# Patient Record
Sex: FEMALE | Race: WHITE
[De-identification: names, ages, dates, MRNs, and addresses within clinical notes are randomized per-mention and may not be internally consistent; named-entity substitution may affect disease eponyms.]

---

## 2021-06-17 ENCOUNTER — HOSPITAL ENCOUNTER (OUTPATIENT)
Dept: HOSPITAL 84 - D.OPS | Age: 51
Discharge: HOME | End: 2021-06-17
Attending: PODIATRIST
Payer: MEDICARE

## 2021-06-17 VITALS — DIASTOLIC BLOOD PRESSURE: 84 MMHG | SYSTOLIC BLOOD PRESSURE: 123 MMHG

## 2021-06-17 VITALS — HEIGHT: 64 IN | BODY MASS INDEX: 26.46 KG/M2 | WEIGHT: 155 LBS | BODY MASS INDEX: 26.46 KG/M2

## 2021-06-17 DIAGNOSIS — M20.11: Primary | ICD-10-CM

## 2021-06-17 DIAGNOSIS — B35.1: ICD-10-CM

## 2021-06-17 LAB
ANION GAP SERPL CALC-SCNC: 13.2 MMOL/L (ref 8–16)
BASOPHILS NFR BLD AUTO: 1 % (ref 0–2)
BUN SERPL-MCNC: 5 MG/DL (ref 7–18)
CALCIUM SERPL-MCNC: 9.7 MG/DL (ref 8.5–10.1)
CHLORIDE SERPL-SCNC: 94 MMOL/L (ref 98–107)
CO2 SERPL-SCNC: 32.2 MMOL/L (ref 21–32)
CREAT SERPL-MCNC: 0.7 MG/DL (ref 0.6–1.3)
EOSINOPHIL NFR BLD: 1.4 % (ref 0–7)
ERYTHROCYTE [DISTWIDTH] IN BLOOD BY AUTOMATED COUNT: 20.1 % (ref 11.5–14.5)
GLUCOSE SERPL-MCNC: 94 MG/DL (ref 74–106)
HCT VFR BLD CALC: 38.1 % (ref 36–48)
HGB BLD-MCNC: 12.2 G/DL (ref 12–16)
LYMPHOCYTES # BLD: 1.9 10X3/UL (ref 1.18–3.74)
LYMPHOCYTES NFR BLD AUTO: 21.3 % (ref 15–50)
MCH RBC QN AUTO: 22.6 PG (ref 26–34)
MCHC RBC AUTO-ENTMCNC: 32.1 G/DL (ref 31–37)
MCV RBC: 70.4 FL (ref 80–100)
MONOCYTES NFR BLD: 7 % (ref 2–11)
NEUTROPHILS # BLD: 6.1 10X3/UL (ref 1.56–6.13)
NEUTROPHILS NFR BLD AUTO: 69.3 % (ref 40–80)
OSMOLALITY SERPL CALC.SUM OF ELEC: 268 MOSM/KG (ref 275–300)
PLATELET # BLD: 285 10X3/UL (ref 130–400)
PMV BLD AUTO: 7 FL (ref 7.4–10.4)
POTASSIUM SERPL-SCNC: 3.4 MMOL/L (ref 3.5–5.1)
RBC # BLD AUTO: 5.41 10X6/UL (ref 4–5.4)
SODIUM SERPL-SCNC: 136 MMOL/L (ref 136–145)
WBC # BLD AUTO: 8.8 10X3/UL (ref 4.8–10.8)

## 2021-06-17 NOTE — OP
PATIENT NAME:  ABIMAEL KEARNS                            MEDICAL RECORD: [id number]
:70                                             LOCATION:D.OPS          
                                                         ADMISSION DATE:        
SURGEON:  KRISTIAN WOODRUFF             
 
 
DATE OF OPERATION:  2021
 
SURGEON:  Kristian Woodruff DPM
 
PREOPERATIVE DIAGNOSIS:  Hallux abductovalgus, right foot.
 
POSTOPERATIVE DIAGNOSIS:  Hallux abductovalgus, right foot.
 
PROCEDURE:  Bakari bunionectomy, right foot.
 
ANESTHESIA:  Local with monitored anesthesia care.
 
HEMOSTASIS:  Pneumatic ankle tourniquet inflated to 250 mmHg.
 
ESTIMATED BLOOD LOSS:  Minimal.
 
MATERIALS:  A 3-0 Vicryl, 5-0 Vicryl, 4-0 nylon, one 2.5 x 18 mm headless
[employer] screw ([employer]).
 
INJECTABLES:  20 cc of 0.25% Marcaine plain preoperatively.
 
The patient has a longstanding history of pain associated with a bunion
deformity on the right foot.  She has tried wider shoes to no avail.  She is
here today for surgical correction of this chronically painful deformity.  We
again reviewed the risks and benefits of the procedure.  Complications were
discussed.  All questions were answered.  She was appropriately consented for
the above-mentioned procedure.
 
DESCRIPTION OF PROCEDURE:  The patient was brought into the operating room and
placed on the operating table in a supine position.  A timeout was called with
Dr. Woodruff, who identified the patient, the surgical site, and the surgery to
be performed.  Once appropriate anesthesia was obtained, the foot was prepped
and draped in the usual aseptic manner.
 
Attention was directed to the dorsal aspect of the first metatarsophalangeal
joint where a 6 cm linear incision was made just over the first
metatarsophalangeal joint.  This incision was carried deep through soft tissue
with care being taken to retract all vital neurovascular structures.  All
bleeders were cauterized along the way.  The periosteum was then reflected from
the head of the first metatarsal and the base of the proximal phalanx, thus
exposing the hypertrophied medial eminence of the first metatarsal head. 
Utilizing a sagittal saw, the hypertrophied medial eminence was resected. 
Attention was then directed to the first intermetatarsal space where utilizing
blunt and sharp dissection, the fibular sesamoidal ligament was identified and
sharply transected.  Attention was then directed to the base of the proximal
phalanx where the adductor hallucis tendon was identified and sharply
transected.  Attention was then redirected to the medial aspect of the first
metatarsal head where a V-shaped osteotomy was created in the head of the bone. 
This was a through and through osteotomy with the apex oriented distally.  The
capital fragment was then translocated laterally and impacted upon the first
metatarsal shaft.  Next, utilizing 's recommended technique one 2.5
mm screw was placed across the osteotomy.  All remaining overhanging bone was
 
 
 
OPERATIVE REPORT                               [id number]    URMILAABIMAEL GAMBLE          
 
 
removed with a bone saw.  The surgical site was then irrigated with copious
amounts of normal sterile saline via bulb syringe.  The periosteum was then
reapproximated and coapted using 3-0 Vicryl.  Subcutaneous was then
reapproximated and coapted using 5-0 Vicryl.  The skin was then reapproximated
and coapted utilizing 4-0 nylon.  A dressing consisting of Adaptic, Xeroform, 4
x 4, Kerlix, and Coban was then applied to the right foot.  The pneumatic ankle
tourniquet was deflated and capillary refill time was immediate to all digits of
the right foot.
 
The patient tolerated the procedure and anesthesia well.  She left the operating
room with vital signs stable and capillary refill time intact.
 
The patient will be discharged home with instructions to ice and elevate the
right foot.  She was dispensed a boot to further help offload the area.  She was
provided with prescriptions for Norco 5/325, #20 and Phenergan 25 mg, #10.  She
has my cell phone number for any after hours difficulty.  She will also use
crutches to partial weightbear on the right foot.  There were no complications
with the procedure and we will follow up with her on Monday, 2021.
 
TRANSINT:[id number] Voice Confirmation ID: [id number] DOCUMENT ID: [id number]
                                           
                                           KRISTIAN WOODRUFF             
 
 
 
 
 
 
 
 
 
 
 
 
 
 
 
 
 
 
 
 
 
 
 
 
 
CC:                                                             [id number]
DICTATION DATE: 21 155     :     21      [facility] 
                                                                      21
[facility]                                          
[address] [place] [address]

## 2021-06-17 NOTE — NUR
DC TEACHING COMPLETE. VERBALIZED UNDERSTANDING. DR BARRIOS HAS
SPOKEN TO PT AS WELL.
PIV REMOVED BY PHILIP HANNON, CATHETER INTACT.  PT DRESSING.
1523 CRITERIA FOR DC HAS BEEN MET, PT WAITING ON TRANSPORTATION.
REQUESTED SNACKS- [employer], PB, CHOCOLATE PUDDING AND DIET
COLA GIVEN.

## 2021-06-17 NOTE — NUR
PT'S NOTIFIED THIS NURSE THAT HER RIDE WAS HERE WAITING.  THIS NURSE
INFORMED PT THAT IT WOULD BE A FEW MINUTES.  THIS NURSE WENT TO DC PT
VIA WC AND PT HAD ALREADY LEFT THE FLOOR WITHOUT NOTIFYING STAFF.